# Patient Record
(demographics unavailable — no encounter records)

---

## 2025-01-23 NOTE — ASSESSMENT
[Carbohydrate Consistent Diet] : carbohydrate consistent diet [Diabetic Medications] : Risks and benefits of diabetic medications were discussed [FreeTextEntry1] : Patient with h/o of PCOS associated with hirsutism and obesity.  Did OGTT to r/o prediabetes and insulin resistance and did show high insulin response.  Prolactin mildly elevated 29, and normal now 79.5.  An MRI of the pituitary was normal.. Androgens on OCP and thyroid levels were normal.   Encourage healthy lifestyle including a low carb diet and exercise as tolerated.  She wants to resume Wegovy 0.25 mg weekly and will remain on this dose since she did best on this.  We did give her a sample of 4 pens.  She will continue spironolactone 50 mg BID. Will follow-up here in 3 months with new labs.  She will call if she is having any problems with the medications or getting them

## 2025-01-23 NOTE — HISTORY OF PRESENT ILLNESS
[FreeTextEntry1] : Patient has had irregular menses since age 9. Associated with acne and excess facial and chest hair. She was diagnosed with PCOS. She has been on oral contraceptives for the past year  and subsequently gained 30 lbs. since last year she has lost 15 pounds.  She was told a sonogram showed cysts on her ovaries. She is concerned about fertility in the future. She had an abnormal  GTT and was on  metformin 500 mg  BID  but stopped it.  Had been taking Ozempic 0. 5 mg weekly but has been off due to insurance changes.  She recently started birth control.  Prior to that her menses were erratic and she had noted increased  hair on her chest and chin.  Has not started spironolactone yet.  Was eventually on Wegovy 0.25 and was doing well then  0.5 she felt more depressed on this and has been off since July. Wegovy 0.25 was ordered again but she has never received it. She also has an elevated prolactin denies galactorrhea or headaches and an MRI of the pituitary was negative.

## 2025-01-23 NOTE — PHYSICAL EXAM
[Alert] : alert [No Acute Distress] : no acute distress [Normal Voice/Communication] : normal voice communication [EOMI] : extra ocular movement intact [PERRL] : pupils equal, round and reactive to light [Thyroid Not Enlarged] : the thyroid was not enlarged [No Thyroid Nodules] : no palpable thyroid nodules [No Respiratory Distress] : no respiratory distress [Clear to Auscultation] : lungs were clear to auscultation bilaterally [Normal PMI] : the apical impulse was normal [Normal Rate] : heart rate was normal [Normal Bowel Sounds] : normal bowel sounds [No CVA Tenderness] : no ~M costovertebral angle tenderness [No Spinal Tenderness] : no spinal tenderness [No Rash] : no rash [Hirsutism] : hirsutism present [Normal Reflexes] : deep tendon reflexes were 2+ and symmetric [No Tremors] : no tremors [Oriented x3] : oriented to person, place, and time [Normal Insight/Judgement] : insight and judgment were intact [de-identified] : terminal hairs on face and chest

## 2025-05-15 NOTE — HISTORY OF PRESENT ILLNESS
[FreeTextEntry1] : Patient has had irregular menses since age 9. Associated with acne and excess facial and chest hair. She was diagnosed with PCOS. She has been on oral contraceptives for the past year  and subsequently gained 30 lbs. since last year she has lost 15 pounds.  She was told a sonogram showed cysts on her ovaries. She is concerned about fertility in the future. She had an abnormal  GTT and was on  metformin 500 mg  BID  but stopped it.  Had been taking Ozempic 0. 5 mg weekly but has been off due to insurance changes.  She recently started birth control.  Prior to that her menses were erratic and she had noted increased  hair on her chest and chin.  Now on  Wegovy 0.25 and was doing well then  0.5 she felt more depressed so she decreased back to 0.25 mg weekly which she is tolerating. She did start spironolactone plus the birth control pills that has help with the hair growth. . She also has an elevated prolactin denies galactorrhea or headaches and an MRI of the pituitary was negative.  Recent low-dose dexamethasone suppression test was normal. Has been having headaches and back problems her neurosurgeon was diagnosed with scoliosis and Chiari malformation and plans to see a neurologist soon.

## 2025-05-15 NOTE — ASSESSMENT
[Carbohydrate Consistent Diet] : carbohydrate consistent diet [Importance of Diet and Exercise] : importance of diet and exercise to improve glycemic control, achieve weight loss and improve cardiovascular health [FreeTextEntry1] : Patient with h/o of PCOS associated with hirsutism and obesity.  Did OGTT to r/o prediabetes and insulin resistance and did show high insulin response.  Prolactin mildly elevated 29, then 79 79.5.  An MRI of the pituitary was normal..  Most recent prolactin was 14.  Androgens on OCP and thyroid levels were normal.   Encourage healthy lifestyle including a low carb diet and exercise as tolerated.   Will now increase Wegovy to 0.5 mg and she will let me know if she has any side effects from this Follow-up in 3 months with new labs. If she has any scans including the brain from her neurologist please have them forward the reports here as well and we will give her a copy of the MRI that we did a few years ago of her pituitary.

## 2025-05-15 NOTE — PHYSICAL EXAM
[Alert] : alert [No Acute Distress] : no acute distress [Normal Voice/Communication] : normal voice communication [EOMI] : extra ocular movement intact [PERRL] : pupils equal, round and reactive to light [Thyroid Not Enlarged] : the thyroid was not enlarged [No Thyroid Nodules] : no palpable thyroid nodules [No Respiratory Distress] : no respiratory distress [Clear to Auscultation] : lungs were clear to auscultation bilaterally [Normal PMI] : the apical impulse was normal [Normal Rate] : heart rate was normal [Normal Bowel Sounds] : normal bowel sounds [No CVA Tenderness] : no ~M costovertebral angle tenderness [No Spinal Tenderness] : no spinal tenderness [No Rash] : no rash [Hirsutism] : hirsutism present [Normal Reflexes] : deep tendon reflexes were 2+ and symmetric [No Tremors] : no tremors [Oriented x3] : oriented to person, place, and time [Normal Insight/Judgement] : insight and judgment were intact [de-identified] : terminal hairs on face and chest